# Patient Record
Sex: FEMALE | Race: WHITE | NOT HISPANIC OR LATINO | Employment: UNEMPLOYED | URBAN - METROPOLITAN AREA
[De-identification: names, ages, dates, MRNs, and addresses within clinical notes are randomized per-mention and may not be internally consistent; named-entity substitution may affect disease eponyms.]

---

## 2024-06-06 ENCOUNTER — APPOINTMENT (OUTPATIENT)
Dept: RADIOLOGY | Facility: CLINIC | Age: 24
End: 2024-06-06
Payer: COMMERCIAL

## 2024-06-06 DIAGNOSIS — M54.2 CERVICALGIA: ICD-10-CM

## 2024-06-06 PROCEDURE — 72050 X-RAY EXAM NECK SPINE 4/5VWS: CPT

## 2024-10-02 ENCOUNTER — APPOINTMENT (OUTPATIENT)
Dept: URGENT CARE | Facility: CLINIC | Age: 24
End: 2024-10-02

## 2025-04-03 ENCOUNTER — OFFICE VISIT (OUTPATIENT)
Age: 25
End: 2025-04-03
Payer: COMMERCIAL

## 2025-04-03 VITALS — TEMPERATURE: 98 F | WEIGHT: 219 LBS

## 2025-04-03 DIAGNOSIS — L70.0 ACNE VULGARIS: ICD-10-CM

## 2025-04-03 DIAGNOSIS — L02.92 FURUNCULOSIS: ICD-10-CM

## 2025-04-03 DIAGNOSIS — L83 ACANTHOSIS NIGRICANS: Primary | ICD-10-CM

## 2025-04-03 PROCEDURE — 99204 OFFICE O/P NEW MOD 45 MIN: CPT | Performed by: REGISTERED NURSE

## 2025-04-03 RX ORDER — NORETHINDRONE 0.35 MG/1
1 TABLET ORAL DAILY
COMMUNITY

## 2025-04-03 RX ORDER — METFORMIN HYDROCHLORIDE 500 MG/1
1000 TABLET, EXTENDED RELEASE ORAL DAILY
COMMUNITY
Start: 2024-04-24 | End: 2025-04-24

## 2025-04-03 NOTE — PROGRESS NOTES
"[facility] Note     Patient Name: Tejal Zarate  Encounter Date: 04/03/25     Have you been cared for by a [facility] Dermatologist in the last 3 years and, if so, which description applies to you?    NO.   I am considered a \"new\" patient and must complete all patient intake questions. I am FEMALE/of child-bearing potential.    REVIEW OF SYSTEMS:  Have you recently had or currently have any of the following? Recent fever or chills? No  Any non-healing wound? No  Are you pregnant or planning to become pregnant? No  Are you currently or planning to be nursing or breast feeding? No   PAST MEDICAL HISTORY:  Have you personally ever had or currently have any of the following?  If \"YES,\" then please provide more detail. Skin cancer (such as Melanoma, Basal Cell Carcinoma, Squamous Cell Carcinoma?  No  Tuberculosis, HIV/AIDS, Hepatitis B or C: No  Radiation Treatment No   HISTORY OF IMMUNOSUPPRESSION:   Do you have a history of any of the following:  Systemic Immunosuppression such as Diabetes, Biologic or Immunotherapy, Chemotherapy, Organ Transplantation, Bone Marrow Transplantation or Prednsione?  No    Answering \"YES\" requires the addition of the dotphrase \"IMMUNOSUPPRESSED\" as the first diagnosis of the patient's visit.   FAMILY HISTORY:  Any \"first degree relatives\" (parent, brother, sister, or child) with the following?    Skin Cancer, Pancreatic or Other Cancer? No   PATIENT EXPERIENCE:    Do you want the Dermatologist to perform a COMPLETE skin exam today including a clinical examination under the \"bra and underwear\" areas?  NO  If necessary, do we have your permission to call and leave a detailed message on your Preferred Phone number that includes your specific medical information?  Yes      Not on File No current outpatient medications on file.          Whom besides the patient is providing clinical information about today's encounter?   NO ADDITIONAL HISTORIAN (patient alone provided " history)    Physical Exam and Assessment/Plan by Diagnosis:    ACANTHOSIS NIGRICANS  Physical Exam:  Anatomic Location Affected:  posterior neck, axillae  Morphological Description:  hyperpigmented velvety plaque   Pertinent Positives:  Pertinent Negatives:    Additional History of Present Condition:  Patient notes she had it since she was 15 years old; she notes she was seen before for it and given clindamycin lotion and Sulfacetamide Sodium-sulfur which she had an allergic reaction to. She has not tried anything else since then.     Assessment and Plan:  Based on a thorough discussion of this condition and the management approach to it (including a comprehensive discussion of the known risks, side effects and potential benefits of treatment), the patient (family) agrees to implement the following specific plan:  Discussed topical treatment; tretinoin   Educated patient that this condition is very difficult to treat and you may not see improvement after using tretinoin       FURUNCULOSIS  Physical Exam:  Anatomic Location Affected:  left axillae  Morphological Description:  fibrotic plaque (lesion not active today)  Pertinent Positives:  Pertinent Negatives:    Additional History of Present Condition:  Over the past year to year and a half she has been having a recurring boil like lesion in the left axilla. She reports noticing a correlation with her menstrual cycle.     Assessment and Plan:  Based on a thorough discussion of this condition and the management approach to it (including a comprehensive discussion of the known risks, side effects and potential benefits of treatment), the patient (family) agrees to implement the following specific plan:  START using benzoyl peroxide wash to the areas once daily.  START using clindamycin 1% lotion ONLY TO THE SPECIFIC AREAS once daily.  Discussed considering spironolactone if no improvement while on above treatment regimen.   Advised to reach out to us for further  "evaluation if she notices lesions on other parts of the skin.           ACNE VULGARIS    Physical Exam:  Anatomic Locations Involved: Face  Global Assessment: MILD:  LESS THAN half the face is involved. Some comedones and some papules and/or pustules.  Scarring Present? NONE  Pertinent Positives:  Pertinent Negatives:    Additional History of Present Condition:  noted on exam.        TODAY'S PLAN:     PRESCRIPTION MANAGEMENT:  Several treatment options were discussed including topical retinoids and their side effects.     Skin Hygiene:      Wash affected areas (face, chest, and back) TWICE A DAY with a mild cleanser such as CeraVe or Cetaphil.  Use only mild cleansers (hypoallergenic and without fragrances) and fragrance free detergent (not \"unscented\" products which contain a masking agent); we discussed avoiding irritants/fragranced products.  Apply a good oil-free facial moisturizer AT LEAST TWO TIMES A DAY \" such as CeraVe or Cetaphil.  Minimize the application of oils and cosmetics to the affected skin.  This includes HAIR PRODUCTS such as \"leave in\" conditioners.  Unless the product specifically states that it \"won't cause acne,\" \"won't clog pores,\" and/or \"is non-comedogenic\" then it may actually CAUSE acne.  If you smoke, STOP. Nicotine increases sebum retention and increased scale within the follicles, forming comedones (blackheads and whiteheads).  Abrasive treatments such as dermabrasion and spa facials may aggravate inflammatory acne.  Do NOT scratch or pick your acne bumps.  The evidence that diet directly affects acne remains weak.  However, diet does affect your overall health.  Eat plenty of fresh fruit and vegetables.  Avoid protein or amino acid supplements, particularly if they contain leucine. Consider a low-glycemic, low-protein and low-dairy diet.  Be mindful that certain medications may cause of aggravate acne.  Make sure to tell your Dermatologist if you start a new prescription, nutritional " supplement, and/or herbal remedy.      MORNING Topical Regimen:      Benzoyl Peroxide Wash:  Apply to skin while in shower/bath; gently lather into affected areas; leave on for 2-3 minutes; then, rinse completely. Recommends Panoxyl 4% acne wash.   Clindamycin 1% lotion IN THE MORNING:  After gently washing and drying your skin, apply this TOPICAL medication evenly over your entire face, avoiding the eyes and corners of the mouth      EVENING Topical Regimen:      Tretinoin 0.025% cream AT LEAST 1 HOUR BEFORE BEDTIME:  Evenly spread a SINGLE pea-sized amount of this medication over your entire face, avoiding the eyes and corners of the mouth.      SYSTEMIC Strategies:      NONE        MEDICAL DECISION MAKING  Treatment Goal:  Resolution of the CHRONIC condition.       Chronic condition is NOT at treatment goal.  It is progressing along its expected course OR is poorly-controlled.             Scribe Attestation      I,:  Melanie Mcguire MA am acting as a scribe while in the presence of the attending physician.:       I,:  Lars Mcmahon MD personally performed the services described in this documentation    as scribed in my presence.:

## 2025-04-05 RX ORDER — TRETINOIN 0.25 MG/G
CREAM TOPICAL
Qty: 45 G | Refills: 4 | Status: SHIPPED | OUTPATIENT
Start: 2025-04-05

## 2025-04-05 RX ORDER — CLINDAMYCIN PHOSPHATE 10 UG/ML
LOTION TOPICAL 2 TIMES DAILY
Qty: 60 ML | Refills: 1 | Status: SHIPPED | OUTPATIENT
Start: 2025-04-05